# Patient Record
Sex: MALE
[De-identification: names, ages, dates, MRNs, and addresses within clinical notes are randomized per-mention and may not be internally consistent; named-entity substitution may affect disease eponyms.]

---

## 2020-09-04 ENCOUNTER — HOSPITAL ENCOUNTER (EMERGENCY)
Dept: HOSPITAL 97 - ER | Age: 30
Discharge: HOME | End: 2020-09-04
Payer: COMMERCIAL

## 2020-09-04 DIAGNOSIS — R51: ICD-10-CM

## 2020-09-04 DIAGNOSIS — G40.802: Primary | ICD-10-CM

## 2020-09-04 LAB
ALBUMIN SERPL BCP-MCNC: 4 G/DL (ref 3.4–5)
ALP SERPL-CCNC: 98 U/L (ref 45–117)
ALT SERPL W P-5'-P-CCNC: 39 U/L (ref 12–78)
AST SERPL W P-5'-P-CCNC: 16 U/L (ref 15–37)
BUN BLD-MCNC: 10 MG/DL (ref 7–18)
GLUCOSE SERPLBLD-MCNC: 95 MG/DL (ref 74–106)
HCT VFR BLD CALC: 44.1 % (ref 39.6–49)
INR BLD: 1.05
LYMPHOCYTES # SPEC AUTO: 1.5 K/UL (ref 0.7–4.9)
METHAMPHET UR QL SCN: NEGATIVE
PMV BLD: 9.8 FL (ref 7.6–11.3)
POTASSIUM SERPL-SCNC: 3.8 MMOL/L (ref 3.5–5.1)
RBC # BLD: 5.21 M/UL (ref 4.33–5.43)
THC SERPL-MCNC: NEGATIVE NG/ML

## 2020-09-04 PROCEDURE — 99284 EMERGENCY DEPT VISIT MOD MDM: CPT

## 2020-09-04 PROCEDURE — 80329 ANALGESICS NON-OPIOID 1 OR 2: CPT

## 2020-09-04 PROCEDURE — 96365 THER/PROPH/DIAG IV INF INIT: CPT

## 2020-09-04 PROCEDURE — 85610 PROTHROMBIN TIME: CPT

## 2020-09-04 PROCEDURE — 96361 HYDRATE IV INFUSION ADD-ON: CPT

## 2020-09-04 PROCEDURE — 82947 ASSAY GLUCOSE BLOOD QUANT: CPT

## 2020-09-04 PROCEDURE — 80048 BASIC METABOLIC PNL TOTAL CA: CPT

## 2020-09-04 PROCEDURE — 85025 COMPLETE CBC W/AUTO DIFF WBC: CPT

## 2020-09-04 PROCEDURE — 36415 COLL VENOUS BLD VENIPUNCTURE: CPT

## 2020-09-04 PROCEDURE — 80076 HEPATIC FUNCTION PANEL: CPT

## 2020-09-04 PROCEDURE — 80320 DRUG SCREEN QUANTALCOHOLS: CPT

## 2020-09-04 PROCEDURE — 93005 ELECTROCARDIOGRAM TRACING: CPT

## 2020-09-04 PROCEDURE — 85730 THROMBOPLASTIN TIME PARTIAL: CPT

## 2020-09-04 PROCEDURE — 80307 DRUG TEST PRSMV CHEM ANLYZR: CPT

## 2020-09-04 NOTE — XMS REPORT
Continuity of Care Document

                          Created on:2020



Patient:RHYS MIR

Sex:Male

:1990

External Reference #:[id number]





Demographics







                          Address                   [address] [place] [id number]

 

                          Home Phone                [phone]

 

                          Preferred Language        Unknown

 

                          Marital Status            Unknown

 

                          Protestant Affiliation     Unknown

 

                          Race                      Unknown

 

                          Additional Race(s)        Unavailable

 

                          Ethnic Group              Unknown









Author







                          Organization              [facility]

t

 

                          Address                   [address] [place] [address] Dr. Corea [address] [place] [id number]

 

                          Phone                     [phone]









Care Team Providers







                    Name                Role                Phone

 

                    Unavailable         Unavailable         Unavailable









Problems

This patient has no known problems.



Allergies, Adverse Reactions, Alerts

This patient has no known allergies or adverse reactions.



Medications

This patient has no known medications.



Procedures

This patient has no known procedures.



Results

This patient has no known results.

## 2020-09-06 NOTE — EDPHYS
Physician Documentation                                                                           

 [facility]                                                                 

Name: Pavan Toscano                                                                                 

Age: 29 yrs                                                                                       

Sex: Male                                                                                         

: 1990                                                                                   

MRN: [id number]                                                                                   

Arrival Date: 2020                                                                          

Time: 18:52                                                                                       

Account#: [id number]                                                                            

Bed 5                                                                                             

Private MD:                                                                                       

ED Physician Chele Valero                                                                     

HPI:                                                                                              

                                                                                             

19:05 This 29 yrs old  Male presents to ER via EMS with complaints of Probable        cp  

      Seizure.                                                                                    

19:05 The patient presents after having a single isolated seizure, that lasted 15 minute(s),  cp  

      the episode(s) was witnessed, by co-worker(s). Character of seizure(s): Loss of             

      consciousness: the patient experienced loss of consciousness, Motor activity:               

      generalized, shaking all over, Incontinence: none. Seizure onset: just prior to             

      arrival. Seizure Hx: Seizure medications: phenytoin. Associated injury: Left upper          

      extremity: anterior aspect of left shoulder, pain. EMS care: none. Current symptoms:        

      Currently, the patient is not experiencing any symptoms, the patient feels back to          

      baseline.                                                                                   

19:05 Patient reports seizure medication recently changed last week from Keppra to Dilantin   cp  

      by DR Grover.                                                                             

                                                                                                  

Historical:                                                                                       

- Allergies:                                                                                      

18:57 No Known Allergies;                                                                     jl7 

- Home Meds:                                                                                      

18:57 phenytoin oral [Active];                                                                jl7 

- PMHx:                                                                                           

18:57 Seizures;                                                                               jl7 

- PSHx:                                                                                           

18:57 Ear Tubes;                                                                              jl7 

                                                                                                  

- Immunization history:: Adult Immunizations.                                                     

- Social history:: Smoking status: Patient/guardian denies using.                                 

                                                                                                  

                                                                                                  

ROS:                                                                                              

19:10 Constitutional: Negative for body aches, chills, fever, poor PO intake.                 cp  

19:10 Eyes: Negative for injury, pain, redness, and discharge.                                cp  

19:10 Cardiovascular: Negative for chest pain.                                                    

19:10 Respiratory: Negative for cough, shortness of breath, wheezing.                             

19:10 Abdomen/GI: Negative for abdominal pain, vomiting, diarrhea, constipation.                  

19:10 Back: Negative for pain at rest, pain with movement.                                        

19:10 Neuro: Positive for headache, history of seizure, Negative for altered mental status,       

      weakness.                                                                                   

19:10 All other systems are negative.                                                         cp  

                                                                                                  

Exam:                                                                                             

19:15 Constitutional: The patient appears in no acute distress, alert, awake,                 cp  

      non-diaphoretic, non-toxic, well developed, well nourished.                                 

19:15 Head/Face:  Normocephalic, atraumatic.                                                  cp  

19:15 Eyes: Periorbital structures: appear normal, Pupils: equal, round, and reactive to          

      light and accomodation, Extraocular movements: intact throughout, Conjunctiva: normal,      

      no exudate, no injection, Lids and lashes: appear normal, bilaterally.                      

19:15 ENT: External ear(s): are unremarkable, Nose: is normal, Mouth: Lips: moist, Oral           

      mucosa: moist, Posterior pharynx: Airway: no evidence of obstruction, patent.               

19:15 Neck: ROM/movement: is normal, is supple, without pain, no range of motions limitations.    

19:15 Chest/axilla: Inspection: normal, Palpation: is normal, no crepitus, no tenderness.         

19:15 Cardiovascular: Rate: normal, Rhythm: regular.                                              

19:15 Respiratory: the patient does not display signs of respiratory distress,  Respirations:     

      normal, no use of accessory muscles, labored breathing, is not present, Breath sounds:      

      are clear throughout, no decreased breath sounds.                                           

19:15 Abdomen/GI: Inspection: abdomen appears normal, Palpation: abdomen is soft and              

      non-tender, in all quadrants.                                                               

19:15 Musculoskeletal/extremity: Exam is negative for decreased range of motion, deformity,       

      injury.                                                                                     

19:15 Neuro: Orientation: to person, place \T\ time. Mentation: is normal, Cerebellar function:   

      is grossly normal, Motor: moves all fours, strength is normal, Sensation: is normal.        

19:20 ECG was reviewed by the Attending Physician.                                            cp  

                                                                                                  

Vital Signs:                                                                                      

18:53  / 72; Pulse 96; Resp 14; Temp 97.2; Pulse Ox 97% ; Weight 81.65 kg; Pain 2/10;   jl7 

19:00  / 80; Pulse 91; Resp 16; Pulse Ox 97% on R/A;                                    rv  

19:30  / 96; Pulse 83; Resp 13; Pulse Ox 98% on R/A;                                    rv  

20:00  / 76; Pulse 79; Resp 17; Pulse Ox 100% on R/A;                                   rv  

20:58  / 92; Pulse 79; Resp 15; Temp 98; Pulse Ox 100% on R/A;                          rv  

                                                                                                  

Bethesda Coma Score:                                                                               

18:57 Eye Response: spontaneous(4). Verbal Response: oriented(5). Motor Response: obeys       jl7 

      commands(6). Total: 15.                                                                     

                                                                                                  

MDM:                                                                                              

18:55 Patient medically screened.                                                             babak 

19:55 Physician consultation: Casey Grover MD was called at 19:56, was contacted at 19:56,   

      regarding consult, patient's condition, wants patient to restart Keppra at 500mg bid        

      for 5 days and then taper off Dilantin by decreasing 1 tablet less per day until off.       

20:50 Data reviewed: vital signs, nurses notes, lab test result(s), and as a result, I will   cp  

      discharge patient.                                                                          

20:50 Counseling: I had a detailed discussion with the patient and/or guardian regarding: the cp  

      historical points, exam findings, and any diagnostic results supporting the                 

      discharge/admit diagnosis, lab results, the need for outpatient follow up, a                

      neurologist, to return to the emergency department if symptoms worsen or persist or if      

      there are any questions or concerns that arise at home. ED course: VSS. No seizure          

      activity observed while monitoring patient in ED. Will discharge to home for continued      

      monitoring.                                                                                 

                                                                                                  

                                                                                             

18:57 Order name: EKG - Nurse/Tech; Complete Time: 19:18                                      cp  

                                                                                             

18:57 Order name: IV Saline Lock; Complete Time: 18:59                                          

                                                                                             

18:57 Order name: Labs collected and sent; Complete Time: 19:08                               cp  

                                                                                             

18:57 Order name: Urine Dipstick-Ancillary (obtain specimen); Complete Time: 19:45            cp  

                                                                                                  

EC:20 Rate is 86 beats/min. Rhythm is regular. KS interval is normal. QRS interval is         cp  

      prolonged at 104 msec. QT interval is normal. T waves are Inverted in lead aVR.             

      Interpreted by me. Reviewed by me.                                                          

                                                                                                  

Administered Medications:                                                                         

19:08 Drug: NS 0.9% 1000 ml Route: IV; Rate: 1 bolus; Site: right antecubital;                mg2 

21:00 Follow up: IV Status: Completed infusion; IV Intake: 1000ml                             rv  

20:00 Drug: Keppra 500 mg Route: IV; Rate: calculated rate; Site: right antecubital;          mg2 

21:00 Follow up: IV Status: Completed infusion; IV Intake: 100ml                              rv  

                                                                                                  

                                                                                                  

Disposition:                                                                                      

                                                                                             

05:07 Co-signature as Attending Physician, Chele Valero MD I agree with the assessment and tw4 

      plan of care.                                                                               

                                                                                                  

Disposition:                                                                                      

20 20:51 Discharged to Home. Impression: Epilepsy and recurrent seizures.                   

- Condition is Stable.                                                                            

- Discharge Instructions: Seizure, Adult.                                                         

- Prescriptions for Keppra 500 mg Oral Tablet - take 1 tablet by ORAL route every 12              

  hours; 60 tablet.                                                                               

- Medication Reconciliation Form, Thank You Letter, Antibiotic Education, Prescription            

  Opioid Use form.                                                                                

- Follow up: Casey Grover MD; When: 1 week; Reason: Recheck today's complaints.              

- Problem is an ongoing problem.                                                                  

- Symptoms have improved.                                                                         

- Notes: Patient instructed to take Keppra twice daily and after 5 days, decrease                 

  Dilantin by 1 tablet per day until off. Follow-up with DR Grover next week                    

                                                                                                  

                                                                                                  

Signatures:                                                                                       

Virgilio Matos MD MD cha Page, Corey, PA PA   cp                                                   

Mary De Leon, RN                          RN   tw2                                                  

Bertram Monroy RN                        RN   jl7                                                  

Chele Valero MD MD   tw4                                                  

Waldemar De Leon, RN                    RN   mg2                                                  

Brayan Carmona RN                    RN   rv                                                   

                                                                                                  

Corrections: (The following items were deleted from the chart)                                    

                                                                                             

20:59 20:51 2020 20:51 Discharged to Home. Impression: Epilepsy and recurrent seizures. rv  

      Condition is Stable. Forms are Medication Reconciliation Form, Thank You Letter,            

      Antibiotic Education, Prescription Opioid Use. Follow up: Casey Grover; When: 1          

      week; Reason: Recheck today's complaints. Problem is an ongoing problem. Symptoms have      

      improved. cp                                                                                

                                                                                                  

**************************************************************************************************

## 2020-09-06 NOTE — ER
Nurse's Notes                                                                                     

 [facility]                                                                 

Name: Pavan Toscano                                                                                 

Age: 29 yrs                                                                                       

Sex: Male                                                                                         

: 1990                                                                                   

MRN: [id number]                                                                                   

Arrival Date: 2020                                                                          

Time: 18:52                                                                                       

Account#: [id number]                                                                            

Bed 5                                                                                             

Private MD:                                                                                       

Diagnosis: Epilepsy and recurrent seizures                                                        

                                                                                                  

Presentation:                                                                                     

                                                                                             

18:53 Chief complaint: EMS states: Witnessed tonic-clonic seizure while at work, pt was       jl7 

      postictal on arrival. Pt A\T\Ox4 in triage. Pt hit head, reports HA, denies any other       

      pain. Coronavirus screen: Client denies travel out of the [place] in the last 14 days. At      

      this time, the client does not indicate any symptoms associated with coronavirus-19.        

      Ebola Screen: No symptoms or risks identified at this time. Initial Sepsis Screen: Does     

      the patient meet any 2 criteria? No. Patient's initial sepsis screen is negative. Does      

      the patient have a suspected source of infection? No. Patient's initial sepsis screen       

      is negative. Risk Assessment: Do you want to hurt yourself or someone else? Patient         

      reports no desire to harm self or others. Onset of symptoms was 2020.         

      Care prior to arrival: IV initiated. 18 GA, in the right antecubital area. Transition       

      of care: patient was not received from another setting of care.                             

18:53 Method Of Arrival: EMS: [facility]                                                7 

18:53 Acuity: EVELYN 2                                                                           jl7 

                                                                                                  

Triage Assessment:                                                                                

18:57 General: Appears in no apparent distress. uncomfortable, Behavior is calm, cooperative, jl7 

      appropriate for age. Pain: Complains of pain in HA Pain currently is 2 out of 10 on a       

      pain scale. EENT: No signs and/or symptoms were reported regarding the EENT system.         

      Neuro: Level of Consciousness is awake, alert, obeys commands, Oriented to person,          

      place, time, situation. Cardiovascular: Patient's skin is warm and dry. Respiratory:        

      Airway is patent Respiratory effort is even, unlabored, Respiratory pattern is regular,     

      symmetrical. GI: No signs and/or symptoms were reported involving the gastrointestinal      

      system. : No signs and/or symptoms were reported regarding the genitourinary system.      

      Derm: Skin is pink, warm \T\ dry. Musculoskeletal: No signs and/or symptoms reported        

      regarding the musculoskeletal system.                                                       

                                                                                                  

Historical:                                                                                       

- Allergies:                                                                                      

18:57 No Known Allergies;                                                                     jl7 

- Home Meds:                                                                                      

18:57 phenytoin oral [Active];                                                                jl7 

- PMHx:                                                                                           

18:57 Seizures;                                                                               jl7 

- PSHx:                                                                                           

18:57 Ear Tubes;                                                                              jl7 

                                                                                                  

- Immunization history:: Adult Immunizations.                                                     

- Social history:: Smoking status: Patient/guardian denies using.                                 

                                                                                                  

                                                                                                  

Screenin:55 Abuse screen: Denies threats or abuse. Nutritional screening: No deficits noted.        tw2 

      Tuberculosis screening: No symptoms or risk factors identified. Fall Risk None              

      identified.                                                                                 

                                                                                                  

Assessment:                                                                                       

19:19 General: Appears in no apparent distress. comfortable, Behavior is calm, cooperative.   mg2 

      Pain: Complains of pain in head. Neuro: Level of Consciousness is awake, alert, obeys       

      commands, Oriented to person, place, time, situation. Neuro: Seizure activity had           

      seizure this afternoon. Cardiovascular: Capillary refill < 3 seconds Patient's skin is      

      warm and dry. Respiratory: Airway is patent Respiratory effort is even, unlabored,          

      Respiratory pattern is regular, symmetrical. GI: No signs and/or symptoms were reported     

      involving the gastrointestinal system. : No signs and/or symptoms were reported           

      regarding the genitourinary system. EENT: No signs and/or symptoms were reported            

      regarding the EENT system. Derm: Skin is intact, is healthy with good turgor, Skin is       

      pink, warm \T\ dry. normal. Musculoskeletal: Circulation, motion, and sensation intact.     

      Capillary refill < 3 seconds.                                                               

20:05 Reassessment: Patient appears in no apparent distress at this time. Patient and/or      mg2 

      family updated on plan of care and expected duration. Pain level reassessed. Patient is     

      alert, oriented x 3, equal unlabored respirations, skin warm/dry/pink.                      

20:59 Neuro: Level of Consciousness is awake, alert, obeys commands, Oriented to person,      rv  

      place, time, situation, Moves all extremities. Full function.                               

                                                                                                  

Vital Signs:                                                                                      

18:53  / 72; Pulse 96; Resp 14; Temp 97.2; Pulse Ox 97% ; Weight 81.65 kg; Pain 2/10;   jl7 

19:00  / 80; Pulse 91; Resp 16; Pulse Ox 97% on R/A;                                    rv  

19:30  / 96; Pulse 83; Resp 13; Pulse Ox 98% on R/A;                                    rv  

20:00  / 76; Pulse 79; Resp 17; Pulse Ox 100% on R/A;                                   rv  

20:58  / 92; Pulse 79; Resp 15; Temp 98; Pulse Ox 100% on R/A;                          rv  

                                                                                                  

Radu Coma Score:                                                                               

18:57 Eye Response: spontaneous(4). Verbal Response: oriented(5). Motor Response: obeys       jl7 

      commands(6). Total: 15.                                                                     

                                                                                                  

ED Course:                                                                                        

18:52 Patient arrived in ED.                                                                  jl7 

18:53 Bed in low position. Call light in reach. Side rails up X2. Seizure precautions         tw2 

      initiated. Cardiac monitor on. Pulse ox on. NIBP on.                                        

18:55 Virgilio Matos MD is Attending Physician.                                             [facility] 

18:56 Triage completed.                                                                       jl7 

18:56 Virgilio Maher PA is PHCP.                                                                cp  

18:56 Arm band placed on.                                                                     tw2 

19:00 Inserted saline lock: by CAPRICE Burden.                                                    mg2 

19:08 Waldemar De Leon RN is Primary Nurse.                                                  mg2 

19:18 No provider procedures requiring assistance completed.                                  mg2 

19:30 Chele Valero MD is Attending Physician.                                            cp  

20:50 Casey Grover MD is Referral Physician.                                             cp  

20:59 IV discontinued, intact, bleeding controlled, No redness/swelling at site. Pressure     rv  

      dressing applied.                                                                           

                                                                                                  

Administered Medications:                                                                         

19:08 Drug: NS 0.9% 1000 ml Route: IV; Rate: 1 bolus; Site: right antecubital;                mg2 

21:00 Follow up: IV Status: Completed infusion; IV Intake: 1000ml                             rv  

20:00 Drug: Keppra 500 mg Route: IV; Rate: calculated rate; Site: right antecubital;          mg2 

21:00 Follow up: IV Status: Completed infusion; IV Intake: 100ml                              rv  

                                                                                                  

                                                                                                  

Intake:                                                                                           

21:00 IV: 100ml; Total: 100ml.                                                                rv  

21:00 IV: 1000ml; Total: 1100ml.                                                              rv  

                                                                                                  

Outcome:                                                                                          

20:51 Discharge ordered by MD.                                                                cp  

20:59 Discharged to home ambulatory.                                                          rv  

20:59 Condition: improved                                                                         

20:59 Discharge instructions given to patient, Instructed on discharge instructions, follow       

      up and referral plans. medication usage, Demonstrated understanding of instructions,        

      follow-up care, medications, Prescriptions given X 1.                                       

20:59 Patient left the ED.                                                                    rv  

                                                                                                  

Signatures:                                                                                       

Virgilio Matos MD MD cha Page, Corey, PA PA cp Wise, Tara, RN                          RN   tw2                                                  

Monroy, Jahala, RN                        RN   jl7                                                  

Waldemar De Leon, RN                    RN   mg2                                                  

Brayan Carmona, RN                    RN   rv                                                   

                                                                                                  

**************************************************************************************************

## 2020-09-07 VITALS — TEMPERATURE: 98 F | DIASTOLIC BLOOD PRESSURE: 92 MMHG | SYSTOLIC BLOOD PRESSURE: 110 MMHG

## 2020-09-07 VITALS — OXYGEN SATURATION: 100 %
